# Patient Record
(demographics unavailable — no encounter records)

---

## 2024-10-18 NOTE — HISTORY OF PRESENT ILLNESS
[FreeTextEntry1] : 45M w/ afib (s/p ablation 1/2023), non-obstructive CAD, HFpEF, and h/o ETOH/cocaine abuse who presents for cardiac evaluation.   10/18/24 OV: pt returns today for f/u and clearance prior to colonoscopy. Recently saw Dr. Daniels and will be seeing Dr. Cook next week.  7/12/24 OV: pt returns today for f/u and results of Event Monitor and TTE. Does endorse feeling episode of afib captured on Event Monitor. Otherwise feeling well.  6/14/24 OV: pt returns today for f/u. Saw EP (Dr. Rangel) in April who d/c'd amiodarone. Reports that he experienced an episode of afib back in May 2024 and feels like it has been increasing in frequency.  11/20/23 TH: pt returns today via TH for results of TTE and labs. Overall feeling well, no new complaints.  11/3/23 OV: pt returns today for f/u. Feeling well, no SOB.  8/1/23 TH: returns today for TH visit to discuss lab results. TTE results still pending. 7/21/23 OV: returns today for f/u, feeling well. No new complaints.  4/7/23 OV: returns today for 3 month f/u and TTE results. No new complaints. 1/27/23 OV: labs reviewed, now in SR s/p ablation on 1/23, will need repeat TTE 3/2023 12/30/22 OV: labs ordered   10/18/24 ECG: NSR at 62 bpm, nl axis 6/14/24 ECG: NSR at 68 bpm 11/3/23 ECG: NSR at 64 bpm 3/14/23 ECG: NSR at 65 bpm 1/27/23 ECG: NSR at 78 bpm  12/30/22 ECG: atrial flutter at 81 bpm, LVLL, LAFB

## 2024-10-18 NOTE — ASSESSMENT
[FreeTextEntry1] : # Afib with RVR - previously in SR s/p ablation on 1/2023 - cont Eliquis 5 mg bid - toprol recently increased from 25 to 50 mg qd, now endorsing lightheadedness  - cont toprol 50 mg qd for now; alternatively can change to toprol 25 bid or increase amio to 4d/wk, will defer to EP  - cont amio 200 mg 3x/week for now - 6/2024 Event Monitor c/w 1% burden of afib with HR ranging from  bpm (117 bpm avg) - results discussed in detail - will be seeing Dr. Cook this coming Monday   # HTN - BP today 92/70 - stable - toprol recenty increased to 50 mg from 25 mg and now endorsing lightheadedness - alternatively, can change to toprol 25 bid or increase amio to 4d/wk, will defer to EP - will be seeing Dr. Cook next week - 7/2024 TTE c/w nl LV sys fxn, EF 58%, mild LVH, Ao root 4.1 cm  # HFpEF - resolved - low salt diet - labs reviewed in detail - 3/2023 TTE c/w EF 50-55%, Ao root 4.2 cm - 7/2023 TTE c/w nl LV sys fxn, EF 61%, Ao root 3.8 cm - 11/2023 TTE c/w nl LV sys fxn, EF 55%, Ao root 4.2 cm - cont Entresto to 49-51 mg bid - cont farxiga 10 mg qd - cont toprol 50 mg qd - 7/2024 TTE c/w nl LV sys fxn, EF 58%, mild LVH, Ao root 4.1 cm - results discussed in detail with pt - check TTE 12/2024  # non-obstructive CAD - cont Lipitor 40 mg qd - cont ASA 81 mg qd  # ETOH/cocaine abuse - currently asx - has been in rehab since 2/2023, doing well  # preop cardiac evaluation prior to colonoscopy - hold Eliquis 2 days prior to procedure and resume the day after (assuming no abnormal bleeding during procedure) - can hold farxiga 3-4 days prior to procedure and restart after  - continue all other medications - pt is medically optimized from a cardiac standpoint for the upcoming surgery with the aforementioned recommendations   RTC after TTE

## 2024-10-22 NOTE — ADDENDUM
[FreeTextEntry1] : I, Desirae Mckeon, am scribing for and the presence of Dr. Cook the following sections: HPI, PMH,Family/social history, ROS, Physical Exam, Assessment / Plan.   I, Zain Cook, personally performed the services described in the documentation, reviewed the documentation recorded by the scribe in my presence and it accurately and completely records my words and actions.

## 2024-10-22 NOTE — REASON FOR VISIT
[FreeTextEntry1] : Mateusz Sanchez is a 45 year old man with atrial fibrillation (Eliquis) s/p PVI (cryo, 1/23/23), HFrEF, HTN, ETOH abuse, tobacco abuse and cocaine abuse who presents for follow-up. He was first admitted to [facility] in 12/2022 for heart failure and AFib with RVR. He underwent DCCV on 12/12/22 and again on 12/19/22. Then started on AMIO load with plan for ablation 12/23/22. During anesthesia induction he became hypotensive for which the procedure was aborted and required pressor support briefly. He was discharged and ultimately underwent cryoablation on 1/23/23. Post procedure he was seen at Dr. Daniels' office, where ECG demonstrated an atrial flutter. He was maintained on amiodarone post procedure. Now in outpatient rehab program. He continues to note abstinence from ETOH or cocaine. His EF has recovered based upon most recent Echo 11/2023.  Amiodarone was tapered and then stopped in April 2024.  He has had recurrent palpitations and LTM in June 2024 showed recurrent PAF with RVR.  He reports compliance with medication regimen including OAC.    He has resumed Amiodarone as per Dr. Guevara.  His father is actively dying in hospice care.

## 2024-10-22 NOTE — DISCUSSION/SUMMARY
[FreeTextEntry1] : Mateusz Sanchez is a 45 year old man with atrial fibrillation (Eliquis) s/p PVI (cryo, 1/23/23), HFrEF, HTN, ETOH abuse, tobacco abuse and cocaine abuse who presents for follow-up. He was first admitted to [facility] in 12/2022 for heart failure and AFib with RVR. He underwent DCCV on 12/12/22 and again on 12/19/22. Then started on AMIO load with plan for ablation 12/23/22. During anesthesia induction he became hypotensive for which the procedure was aborted and required pressor support briefly. He was discharged and ultimately underwent cryoablation on 1/23/23.   Post procedure he was seen at Dr. Daniels' office, where ECG demonstrated an atrial flutter.   1.  Atrial fibrillation Recurrent AF with continued breakthrough symptoms despite the use of Amiodarone Given h/o HFrEF and rapid rates in AF, recommend re-do ablation.  He will call to schedule this in the coming weeks as his father is actively dying.   Continue Metoprolol and Amiodarone for now  2.  Stroke Risk CHADS VASc 2  Continue OAC for TE/CVA PPX  3.  AAD use  We discussed the risks of long-term use of Amiodarone and indication for routine lab monitoring (TFTs, LFTs), use of sunscreen, and routine eye exams.  All questions were answered to his apparent satisfaction

## 2024-10-22 NOTE — PHYSICAL EXAM
[Well Developed] : well developed [Well Nourished] : well nourished [No Acute Distress] : no acute distress [Normal Venous Pressure] : normal venous pressure [No Carotid Bruit] : no carotid bruit [Normal S1, S2] : normal S1, S2 [No Murmur] : no murmur [No Rub] : no rub [No Gallop] : no gallop [Clear Lung Fields] : clear lung fields [Good Air Entry] : good air entry [No Respiratory Distress] : no respiratory distress  [Soft] : abdomen soft [Non Tender] : non-tender [No Masses/organomegaly] : no masses/organomegaly [Normal Gait] : normal gait [No Edema] : no edema [No Cyanosis] : no cyanosis [No Clubbing] : no clubbing [No Rash] : no rash [Moves all extremities] : moves all extremities [No Focal Deficits] : no focal deficits [Normal Speech] : normal speech [Alert and Oriented] : alert and oriented

## 2024-10-22 NOTE — DISCUSSION/SUMMARY
[FreeTextEntry1] : Mateusz Sanchez is a 45 year old man with atrial fibrillation (Eliquis) s/p PVI (cryo, 1/23/23), HFrEF, HTN, ETOH abuse, tobacco abuse and cocaine abuse who presents for follow-up. He was first admitted to [facility] in 12/2022 for heart failure and AFib with RVR. He underwent DCCV on 12/12/22 and again on 12/19/22. Then started on AMIO load with plan for ablation 12/23/22. During anesthesia induction he became hypotensive for which the procedure was aborted and required pressor support briefly. He was discharged and ultimately underwent cryoablation on 1/23/23.   Post procedure he was seen at Dr. Daniels' office, where ECG demonstrated an atrial flutter.   1.  Atrial fibrillation Recurrent AF with continued breakthrough symptoms despite the use of Amiodarone Given h/o HFrEF and rapid rates in AF, recommend re-do ablation.  He will call to schedule this in the coming weeks as his father is actively dying.   Continue Metoprolol and Amiodarone for now  2.  Stroke Risk CHADS VASc 2  Continue OAC for TE/CVA PPX  3.  AAD use  We discussed the risks of long-term use of Amiodarone and indication for routine lab monitoring (TFTs, LFTs), use of sunscreen, and routine eye exams.  All questions were answered to his apparent satisfaction

## 2024-10-22 NOTE — REASON FOR VISIT
[FreeTextEntry1] : Mateusz Sanchez is a 45 year old man with atrial fibrillation (Eliquis) s/p PVI (cryo, 1/23/23), HFrEF, HTN, ETOH abuse, tobacco abuse and cocaine abuse who presents for follow-up. He was first admitted to [facility] in 12/2022 for heart failure and AFib with RVR. He underwent DCCV on 12/12/22 and again on 12/19/22. Then started on AMIO load with plan for ablation 12/23/22. During anesthesia induction he became hypotensive for which the procedure was aborted and required pressor support briefly. He was discharged and ultimately underwent cryoablation on 1/23/23. Post procedure he was seen at Dr. Daniels' office, where ECG demonstrated an atrial flutter. He was maintained on amiodarone post procedure. Now in outpatient rehab program. He continues to note abstinence from ETOH or cocaine. His EF has recovered based upon most recent Echo 11/2023.  Amiodarone was tapered and then stopped in April 2024.  He has had recurrent palpitations and LTM in June 2024 showed recurrent PAF with RVR.  He reports compliance with medication regimen including OAC.    He has resumed Amiodarone as per Dr. Guevara.  His father is actively dying in hospice care.

## 2024-12-09 NOTE — ASSESSMENT
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 Now normalized pBNP and NYHA class I symptoms. Abstaining from alcohol and other substances for ~1 year. Cr has also improved to normal.  On triple GDMT (minus MRA, used to be on half-dose unclear when this was stopped). Dizziness has resolved. He had recurrence of AF off Amiodarone. Now is s/p ablation (repeat) and pending EP f/u to decide on further Amio. No signs or symptoms of HF. Hematochezia improved, colonoscopy with ulcerations ?related to ASA  - I recommend stopping ASA - Increase Toprol XL to 50 mg twice daily - Entresto 49-51 mg bid - Farxiga 10 mg daily - Continue Eliquis 5 mg bid - Amiodarone - pending f/u with EP - Follow-up in 3 months  Larry Wagoner MD

## 2024-12-09 NOTE — HISTORY OF PRESENT ILLNESS
[FreeTextEntry1] : Date of HF diagnosis: 12/2022 Etiology of CMP: NICMP Date of last hospitalization: 12/2022 Date of last echocardiogram: 7/2023 LVEF/LVEDD:64%/5.3cm Type of ischemic work-up: CCTA Prior RHC: No  Diabetes: No Afib: Yes CKD:No ICD/CRT: No  ACEi/ARB: No ARNI: Yes MRA: No BB: Yes SGLT2i: Yes Nitrates/Hydralazine: No  CardioMEMs: No In clinical trial: No  44 yo man with index HF admission in 12/2022 to [facility]. Underwent DCCV for AF. Was discharged on ARB and BB, returned with abdominal pain and nausea (possibly low output signs) and recurrence of AF. Was loaded with Amiodarone and referred for AF ablation. During anesthesia induction had profound hypotension, procedure was aborted. He was transferred to CCU where he was treated with inotropes until normalization of hemodynamics. No RHC performed.  Eventually transferred to floor and discharged on ARNI, BB, MRA and SGLT2i. His Cr was unstable. He did not have gross volume overload. He was seen by gen [facility] on 12/30 when his BP was 96/66 and labs obtained that day showed a K~5.6 and Cr of 1.99 (best baseline ~1.6). He hasn't made changes since.  After my first visit with him, he underwent elective PVI with Dr. Rangel and i back at home on Amiodarone and GDMT. On 1/30/23 he came to see me complaining of worsening weakness and dizziness. ECG performed here confirmed recurrence of Afib.  He has abstained from alcohol and other substances for ~9 months and is tolerating low dose quadruple GDMT. His pBNP has normalized. He has NYHA class I symptoms. Last echo was done in 7/2023 with complete recovery of LVEF. His only complaint is significant dizziness on multiple occasions which is impacting his QOL.  After our last visit, he underwent a repeat Afib ablation. Remains on AFib until next EP visit. Has no HF signs or symptoms. Compliant with medications. Underwent a colonoscopy which found small ulcerations which according to GI could be related to ASA. He has no established CAD and is on Eliquis.

## 2024-12-09 NOTE — CARDIOLOGY SUMMARY
[de-identified] : 1. Left ventricular cavity is normal in size. Left ventricular wall thickness is mildly increased. Left ventricular systolic function is normal with an ejection fraction of 58 % by Chisholm's method of disks. There are no regional wall motion abnormalities seen. 2. Normal left ventricular diastolic function. 3. Normal right ventricular cavity size, with normal wall thickness, and normal right ventricular systolic function. 4. Normal left and right atrial size. 5. Tricuspid aortic valve with normal leaflet excursion. 6. Aortic root at the sinuses of Valsalva is dilated, measuring 4.10 cm (indexed 1.88 cm/m). 7. Trace aortic regurgitation. 8. Mild pulmonic regurgitation. 9. Trivial pericardial effusion. 10. No echocardiographic evidence of pulmonary hypertension.

## 2025-01-16 NOTE — CARDIOLOGY SUMMARY
[de-identified] : 10/22/24 NSR 4/16/24 NSR, QTc WNL [de-identified] : TTE 12/9/24 1. Left ventricular cavity is normal in size. Left ventricular wall thickness is normal. Left ventricular systolic function is normal with an ejection fraction of 51 % by Chisholm's method of disks. 2. Normal left ventricular diastolic function. 3. Normal right ventricular cavity size, with normal wall thickness, and probably normal right ventricular systolic function. 4. Normal left and right atrial size. 5. Tricuspid aortic valve with normal leaflet excursion. 6. Mild aortic regurgitation. 7. Aortic root at the sinuses of Valsalva is upper limits of normal, measuring 4.30 cm (indexed 1.97 cm/m). 8. Mild pulmonic regurgitation. 9. Estimated pulmonary artery systolic pressure is 19 mmHg, consistent with normal pulmonary artery pressure. 10. Trace pericardial effusion noted adjacent to the right atrium.

## 2025-01-16 NOTE — DISCUSSION/SUMMARY
[FreeTextEntry1] : Mateusz Sanchez is a 45 year old man with atrial fibrillation (Eliquis) s/p PVI (cryo, 1/23/23), HFrEF, HTN, ETOH abuse, tobacco abuse and cocaine abuse who presents for follow-up. He was first admitted to [facility] in 12/2022 for heart failure and AFib with RVR. He underwent DCCV on 12/12/22 and again on 12/19/22. Then started on AMIO load with plan for ablation 12/23/22. During anesthesia induction he became hypotensive for which the procedure was aborted and required pressor support briefly. He was discharged and ultimately underwent cryoablation on 1/23/23 followed by re-do ablation 12/2024  1.  Atrial fibrillation Maintaining sinus rhythm without symptoms of recurrent arrhythmia Continue Toprol XL for rate control Begin to gradually taper Amiodarone and D/C at the end of February   2.  Stroke Risk CHADS VASc 2  Continue OAC for TE/CVA PPX  3.  AAD use  We discussed the risks of long-term use of Amiodarone and indication for routine lab monitoring (TFTs, LFTs), use of sunscreen, and routine eye exams.  F/U 3 months, sooner as needed

## 2025-01-16 NOTE — REASON FOR VISIT
[FreeTextEntry1] : Mateusz Sanchez is a 45 year old man with atrial fibrillation (Eliquis) s/p PVI (cryo, 1/23/23), HFrEF, HTN, ETOH abuse, tobacco abuse and cocaine abuse who presents for follow-up. He was first admitted to [facility] in 12/2022 for heart failure and AFib with RVR. He underwent DCCV on 12/12/22 and again on 12/19/22. Then started on AMIO load with plan for ablation 12/23/22. During anesthesia induction he became hypotensive for which the procedure was aborted and required pressor support briefly. He was discharged and ultimately underwent cryoablation on 1/23/23. Post procedure he was seen at Dr. Daniels' office, where ECG demonstrated an atrial flutter. He was maintained on amiodarone post procedure. Now in outpatient rehab program. He continues to note abstinence from ETOH or cocaine. His EF has recovered based upon most recent Echo 11/2023.  Amiodarone was tapered and then stopped in April 2024.  He has had recurrent palpitations and LTM in June 2024 showed recurrent PAF with RVR.  He reports compliance with medication regimen including OAC.    He resumed Amiodarone as per Dr. Guevara.  Now s/p repeat ablation 12/5/24: re isolation of the right sided PVs and CTI ablation for induced typical atrial flutter.  He feels well and notes no recurrent palpitations.  Energy level has been good.  No groin complaints.  He denies any bleeding issues.    Continues to abstain from ETOH/ illicit drug use.

## 2025-01-16 NOTE — DISCUSSION/SUMMARY
[FreeTextEntry1] : Mateusz Sanchez is a 45 year old man with atrial fibrillation (Eliquis) s/p PVI (cryo, 1/23/23), HFrEF, HTN, ETOH abuse, tobacco abuse and cocaine abuse who presents for follow-up. He was first admitted to [facility] in 12/2022 for heart failure and AFib with RVR. He underwent DCCV on 12/12/22 and again on 12/19/22. Then started on AMIO load with plan for ablation 12/23/22. During anesthesia induction he became hypotensive for which the procedure was aborted and required pressor support briefly. He was discharged and ultimately underwent cryoablation on 1/23/23 followed by re-do ablation 12/2024  1.  Atrial fibrillation Maintaining sinus rhythm without symptoms of recurrent arrhythmia Continue Toprol XL for rate control Begin to gradually taper Amiodarone and D/C at the end of February   2.  Stroke Risk CHADS VASc 2  Continue OAC for TE/CVA PPX  3.  AAD use  We discussed the risks of long-term use of Amiodarone and indication for routine lab monitoring (TFTs, LFTs), use of sunscreen, and routine eye exams.  F/U 3 months, sooner as needed

## 2025-01-16 NOTE — CARDIOLOGY SUMMARY
[de-identified] : 10/22/24 NSR 4/16/24 NSR, QTc WNL [de-identified] : TTE 12/9/24 1. Left ventricular cavity is normal in size. Left ventricular wall thickness is normal. Left ventricular systolic function is normal with an ejection fraction of 51 % by Chisholm's method of disks. 2. Normal left ventricular diastolic function. 3. Normal right ventricular cavity size, with normal wall thickness, and probably normal right ventricular systolic function. 4. Normal left and right atrial size. 5. Tricuspid aortic valve with normal leaflet excursion. 6. Mild aortic regurgitation. 7. Aortic root at the sinuses of Valsalva is upper limits of normal, measuring 4.30 cm (indexed 1.97 cm/m). 8. Mild pulmonic regurgitation. 9. Estimated pulmonary artery systolic pressure is 19 mmHg, consistent with normal pulmonary artery pressure. 10. Trace pericardial effusion noted adjacent to the right atrium.

## 2025-01-27 NOTE — HISTORY OF PRESENT ILLNESS
[FreeTextEntry1] : 45M PMHx Former drinker/cocaine use (sober since 2022), (Afib s/p DCCV, HFrEF presenting for follow up post-procedure.   1/27/25: -Colonoscopy (1/27/25): Mild diverticulosis of the sigmoid colon. Erythema, edema with small ulcerations in distal portion of terminal ileum (Biopsy: Small intestinal mucosa with no significant histologic abnormality). Remainder of colonic mucosa normal in appearance. Internal hemorrhoids. -Denies any further bleeding. No abdominal pain, nausea/vomiting, abdominal pain -Remains on ASA and Eliquis. Reports that the ASA was added on late 2024.   9/27/24: Patient referred by Dr Daniels. (HF physician) for colonoscopy evaluation and for reported occasional hematochezia. No prior colonoscopy evaluation. States that he's noticed it 2x, last time was last week. States he notes it as a solid streak of dark red blood covering stool. No associated pain or straining at the time this is happening. No known history of hemorrhoids.  States he's a former drinker and cocaine user. Stopped prior to history admission for HF exacerbation in 2022. States he's been focusing a lot on his overall health since then, has remained on GDMT, following closely with cardiology, HF and EP for management of his HF.  States he has experienced reflux in the past, more so when he was drinking daily. Now only occasionally experiences reflux symptoms, probably 3x/month; usually occurs when he eats foods that trigger his symptoms (i.e tomatoes). Denies any dysphagia, odynophagia.  HF physician - Dr Daniels Cardiologist -Dr Guevara EP - Dr Rangel   BW: Hgb 15.3 (11/6/23) PLTs 130 (11/6/23) Cr 1.27 (3/18/24) TB 0.2 (3/18/24) Alk phos 56 (3/18/24) AST 25 (3/18/24) ALT 31 (3/18/24)  TTE (7/8/24): 1. Left ventricular cavity is normal in size. Left ventricular wall thickness is mildly increased. Left ventricular systolic function is normal with an ejection fraction of 58 % by Chisholm's method of disks. There are no regional wall motion abnormalities seen. 2. Normal left ventricular diastolic function. 3. Normal right ventricular cavity size, with normal wall thickness, and normal right ventricular systolic function. 4. Normal left and right atrial size. 5. Tricuspid aortic valve with normal leaflet excursion. 6. Aortic root at the sinuses of Valsalva is dilated, measuring 4.10 cm (indexed 1.88 cm/m). 7. Trace aortic regurgitation. 8. Mild pulmonic regurgitation. 9. Trivial pericardial effusion. 10. No echocardiographic evidence of pulmonary hypertension.  Referred by - Dr Daniels  PMHx - Former drinker/cocaine use (sober since 2022), (Afib s/p DCCV, HFrEF PSHx - hernia repair (2006), ablation (2023), s/p DCCV x 2 Rx - Amiodarone, Farxiga, Entresto, Toprol XL, Eliquis 5 mg BID, lipitor, ASA 81 mg daily Supplements/herbs/OTC - denies A/C or NSAIDs? - only ASA FMHx - mother - diverticulitis; no CRC or GI related malignancy; no IBD Allergies - NKDA EtOH - sober since Dec 2022 Smoking - former smoker Drugs - cocaine - stopped 2022 Diet: Doesn't eat red meat, minimal dairy intake  EGD - no prior Colonoscopy - no prior

## 2025-01-27 NOTE — PHYSICAL EXAM
[Alert] : alert [Normal Voice/Communication] : normal voice/communication [Healthy Appearing] : healthy appearing [No Acute Distress] : no acute distress [Sclera] : the sclera and conjunctiva were normal [Hearing Threshold Finger Rub Not [place]] : hearing was normal [Normal Lips/Gums] : the lips and gums were normal [Oropharynx] : the oropharynx was normal [Normal Appearance] : the appearance of the neck was normal [No Neck Mass] : no neck mass was observed [No Respiratory Distress] : no respiratory distress [No Acc Muscle Use] : no accessory muscle use [Respiration, Rhythm And Depth] : normal respiratory rhythm and effort [Abdomen Tenderness] : non-tender [No Masses] : no abdominal mass palpated [Abdomen Soft] : soft [Cervical Lymph Nodes Enlarged Posterior Bilaterally] : no posterior cervical lymphadenopathy [Supraclavicular Lymph Nodes Enlarged Bilaterally] : no supraclavicular lymphadenopathy [Axillary Lymph Nodes Enlarged Bilaterally] : no axillary lymphadenopathy [No CVA Tenderness] : no CVA  tenderness [No Spinal Tenderness] : no spinal tenderness [Abnormal Walk] : normal gait [No Clubbing, Cyanosis] : no clubbing or cyanosis of the fingernails [No Joint Swelling] : no joint swelling seen [Normal Color / Pigmentation] : normal skin color and pigmentation [] : no rash [Cranial Nerves Intact] : cranial nerves 2-12 were intact [Sensation] : the sensory exam was normal to light touch and pinprick [Motor Exam] : the motor exam was normal [Oriented To Time, Place, And Person] : oriented to person, place, and time

## 2025-01-27 NOTE — PHYSICAL EXAM
[Alert] : alert [Normal Voice/Communication] : normal voice/communication [Healthy Appearing] : healthy appearing [No Acute Distress] : no acute distress [Sclera] : the sclera and conjunctiva were normal [Hearing Threshold Finger Rub [place]] : hearing was normal [Normal Lips/Gums] : the lips and gums were normal [Oropharynx] : the oropharynx was normal [Normal Appearance] : the appearance of the neck was normal [No Neck Mass] : no neck mass was observed [No Respiratory Distress] : no respiratory distress [No Acc Muscle Use] : no accessory muscle use [Respiration, Rhythm And Depth] : normal respiratory rhythm and effort [Abdomen Tenderness] : non-tender [No Masses] : no abdominal mass palpated [Abdomen Soft] : soft [Cervical Lymph Nodes Enlarged Posterior Bilaterally] : no posterior cervical lymphadenopathy [Supraclavicular Lymph Nodes Enlarged Bilaterally] : no supraclavicular lymphadenopathy [Axillary Lymph Nodes Enlarged Bilaterally] : no axillary lymphadenopathy [No CVA Tenderness] : no CVA  tenderness [No Spinal Tenderness] : no spinal tenderness [Abnormal Walk] : normal gait [No Clubbing, Cyanosis] : no clubbing or cyanosis of the fingernails [No Joint Swelling] : no joint swelling seen [Normal Color / Pigmentation] : normal skin color and pigmentation [] : no rash [Cranial Nerves Intact] : cranial nerves 2-12 were intact [Sensation] : the sensory exam was normal to light touch and pinprick [Motor Exam] : the motor exam was normal [Oriented To Time, Place, And Person] : oriented to person, place, and time

## 2025-01-27 NOTE — ASSESSMENT
[FreeTextEntry1] : 45M PMHx Former drinker/cocaine use (sober since 2022), (Afib s/p DCCV, HFrEF presenting for follow up post-procedure.   #Hematochezia  Colonoscopy (1/27/25): Mild diverticulosis of the sigmoid colon. Erythema, edema with small ulcerations in distal portion of terminal ileum (Biopsy: Small intestinal mucosa with no significant histologic abnormality). Remainder of colonic mucosa normal in appearance. Internal hemorrhoids. - Reviewed most recent BW  - Recommend repeat colonoscopy in 10 years for screening, 2034  RTC PRN

## 2025-03-07 NOTE — HISTORY OF PRESENT ILLNESS
[FreeTextEntry1] : 45M w/ afib (s/p ablation 1/2023), non-obstructive CAD, HFpEF, and h/o ETOH/cocaine abuse who presents for cardiac evaluation.   3/7/25 OV: pt returns today for f/u. Had a repeat ablation with Dr. Cook in 12/2024. Overall feeling well since last seen, no new complaints. Now has tapered off amiodarone.  10/18/24 OV: pt returns today for f/u and clearance prior to colonoscopy. Recently saw Dr. Daniels and will be seeing Dr. Cook next week.  7/12/24 OV: pt returns today for f/u and results of Event Monitor and TTE. Does endorse feeling episode of afib captured on Event Monitor. Otherwise feeling well.  6/14/24 OV: pt returns today for f/u. Saw EP (Dr. Rangel) in April who d/c'd amiodarone. Reports that he experienced an episode of afib back in May 2024 and feels like it has been increasing in frequency.  11/20/23 TH: pt returns today via TH for results of TTE and labs. Overall feeling well, no new complaints.  11/3/23 OV: pt returns today for f/u. Feeling well, no SOB.  8/1/23 TH: returns today for TH visit to discuss lab results. TTE results still pending. 7/21/23 OV: returns today for f/u, feeling well. No new complaints.  4/7/23 OV: returns today for 3 month f/u and TTE results. No new complaints. 1/27/23 OV: labs reviewed, now in SR s/p ablation on 1/23, will need repeat TTE 3/2023 12/30/22 OV: labs ordered    3/7/25 ECG: NSR at 64 bpm, nl axis 10/18/24 ECG: NSR at 62 bpm, nl axis 6/14/24 ECG: NSR at 68 bpm 11/3/23 ECG: NSR at 64 bpm 3/14/23 ECG: NSR at 65 bpm 1/27/23 ECG: NSR at 78 bpm  12/30/22 ECG: atrial flutter at 81 bpm, LVLL, LAFB  No antibiotics or any antibiotics < 2 hrs prior to birth

## 2025-03-07 NOTE — ASSESSMENT
[FreeTextEntry1] : # AF w/RVR - previously in SR s/p ablation on 1/2023 - 6/2024 Event Monitor c/w 1% burden of afib with HR ranging from  bpm (117 bpm avg) - results discussed in detail - s/p repeat ablation with EP (Dr. Cook) 12/2024 - now has tapered off amiodarone as per EP - cont Eliquis 5 mg bid - cont toprol 50 mg bid  # HTN - BP today 108/70 - stable for now - cont toprol 50 mg bid - 7/2024 TTE c/w nl LV sys fxn, EF 58%, mild LVH, Ao root 4.1 cm - 12/2024 TTE c/w nl LV size and sys fxn, EF 51% - results discussed in detail with pt   # HFpEF - resolved - ed done re low Na diet - 3/2023 TTE c/w EF 50-55% - 7/2023 TTE c/w nl LV sys fxn, EF 61% - 11/2023 TTE c/w nl LV sys fxn, EF 55% - 7/2024 TTE c/w nl LV sys fxn, EF 58% - 12/2024 TTE c/w nl LV size and sys fxn, EF 51% - results discussed in detail with pt  - cont Entresto to 49-51 mg bid - cont farxiga 10 mg qd - cont toprol 50 mg bid  # non-obstructive CAD - cont Lipitor 40 mg qd - cont ASA 81 mg qd - check labs  # ETOH/cocaine abuse - currently asx - has been in rehab since 2/2023, doing well  RTC Monday via TH

## 2025-03-07 NOTE — HISTORY OF PRESENT ILLNESS
[FreeTextEntry1] : 45M w/ afib (s/p ablation 1/2023), non-obstructive CAD, HFpEF, and h/o ETOH/cocaine abuse who presents for cardiac evaluation.   3/7/25 OV: pt returns today for f/u. Had a repeat ablation with Dr. Cook in 12/2024. Overall feeling well since last seen, no new complaints. Now has tapered off amiodarone.  10/18/24 OV: pt returns today for f/u and clearance prior to colonoscopy. Recently saw Dr. Daniels and will be seeing Dr. Cook next week.  7/12/24 OV: pt returns today for f/u and results of Event Monitor and TTE. Does endorse feeling episode of afib captured on Event Monitor. Otherwise feeling well.  6/14/24 OV: pt returns today for f/u. Saw EP (Dr. Rangel) in April who d/c'd amiodarone. Reports that he experienced an episode of afib back in May 2024 and feels like it has been increasing in frequency.  11/20/23 TH: pt returns today via TH for results of TTE and labs. Overall feeling well, no new complaints.  11/3/23 OV: pt returns today for f/u. Feeling well, no SOB.  8/1/23 TH: returns today for TH visit to discuss lab results. TTE results still pending. 7/21/23 OV: returns today for f/u, feeling well. No new complaints.  4/7/23 OV: returns today for 3 month f/u and TTE results. No new complaints. 1/27/23 OV: labs reviewed, now in SR s/p ablation on 1/23, will need repeat TTE 3/2023 12/30/22 OV: labs ordered    3/7/25 ECG: NSR at 64 bpm, nl axis 10/18/24 ECG: NSR at 62 bpm, nl axis 6/14/24 ECG: NSR at 68 bpm 11/3/23 ECG: NSR at 64 bpm 3/14/23 ECG: NSR at 65 bpm 1/27/23 ECG: NSR at 78 bpm  12/30/22 ECG: atrial flutter at 81 bpm, LVLL, LAFB

## 2025-03-10 NOTE — REASON FOR VISIT
[Home] : at home, [unfilled] , at the time of the visit. [Medical Office: ([facility])___] : at the medical office located in  [Telephone (audio)] : This telephonic visit was provided via audio only technology. [No access to tele-video equipment] : patient lacks access to tele-video equipment. [Verbal consent obtained from patient] : the patient, [unfilled]

## 2025-03-10 NOTE — REASON FOR VISIT
[Home] : at home, [unfilled] , at the time of the visit. [Medical Office: ([facility])___] : at the medical office located in  [Telephone (audio)] : This telephonic visit was provided via audio only technology. [No access to tele-video equipment] : patient lacks access to tele-video equipment. [Verbal consent obtained from patient] : the patient, [unfilled]

## 2025-03-20 NOTE — ASSESSMENT
[FreeTextEntry1] : # AF w/RVR - previously in SR s/p ablation on 1/2023 - 6/2024 Event Monitor c/w 1% burden of afib with HR ranging from  bpm (117 bpm avg) - results discussed in detail - s/p repeat ablation with EP (Dr. Cook) 12/2024 - now has tapered off amiodarone as per EP - cont Eliquis 5 mg bid - cont toprol 50 mg bid  # HTN - stable for now - cont toprol 50 mg bid - 7/2024 TTE c/w nl LV sys fxn, EF 58%, mild LVH, Ao root 4.1 cm - 12/2024 TTE c/w nl LV size and sys fxn, EF 51% - results discussed in detail with pt  # HFpEF - resolved - ed done re low Na diet - 3/2023 TTE c/w EF 50-55% - 7/2023 TTE c/w nl LV sys fxn, EF 61% - 11/2023 TTE c/w nl LV sys fxn, EF 55% - 7/2024 TTE c/w nl LV sys fxn, EF 58% - 12/2024 TTE c/w nl LV size and sys fxn, EF 51% - results discussed in detail with pt - cont Entresto to 49-51 mg bid - cont farxiga 10 mg qd - cont toprol 50 mg bid  # non-obstructive CAD - cont Lipitor 40 mg qd - cont ASA 81 mg qd - 3/2025 lipid panel c/w LDL 72, HDL 36, ,  - results discussed in detail with pt  - ed done re dietary modifications  # ETOH/cocaine abuse - currently asx - has been in rehab since 2/2023, doing well  RTC 9/2025  Spent 11 minutes on telehealth/phone visit, all questions answered in detail.

## 2025-04-14 NOTE — REASON FOR VISIT
[FreeTextEntry1] : Mateusz Sanchez is a 45 year old man with atrial fibrillation (Eliquis) s/p PVI (cryo, 1/23/23), HFrEF, HTN, ETOH abuse, tobacco abuse and cocaine abuse who presents for follow-up. He was first admitted to [facility] in 12/2022 for heart failure and AFib with RVR. He underwent DCCV on 12/12/22 and again on 12/19/22. Then started on AMIO load with plan for ablation 12/23/22. During anesthesia induction he became hypotensive for which the procedure was aborted and required pressor support briefly. He was discharged and ultimately underwent cryoablation on 1/23/23. Post procedure he was seen at Dr. Daniels' office, where ECG demonstrated an atrial flutter. He was maintained on amiodarone post procedure. Now in outpatient rehab program. He continues to note abstinence from ETOH or cocaine. His EF has recovered based upon most recent Echo 11/2023.  Amiodarone was tapered and then stopped in April 2024.  He has had recurrent palpitations and LTM in June 2024 showed recurrent PAF with RVR.  He reports compliance with medication regimen including OAC.    He resumed Amiodarone as per Dr. Guevara.  Now s/p repeat ablation 12/5/24: re isolation of the right sided PVs and CTI ablation for induced typical atrial flutter.  He feels well and notes no recurrent palpitations.  Energy level has been good.  No groin complaints.  He denies any bleeding issues.    Continues to abstain from ETOH/ illicit drug use.

## 2025-04-14 NOTE — REASON FOR VISIT
[FreeTextEntry1] : Mateusz Sanchez is a 45 year old man with atrial fibrillation (Eliquis) s/p PVI (cryo, 1/23/23), HFrEF, HTN, ETOH abuse, tobacco abuse and cocaine abuse who presents for follow-up. He was first admitted to [facility] in 12/2022 for heart failure and AFib with RVR. He underwent DCCV on 12/12/22 and again on 12/19/22. Then started on AMIO load with plan for ablation 12/23/22. During anesthesia induction he became hypotensive for which the procedure was aborted and required pressor support briefly. He was discharged and ultimately underwent cryoablation on 1/23/23. Post procedure he was seen at Dr. Daniels' office, where ECG demonstrated an atrial flutter. He was maintained on amiodarone post procedure. Now in outpatient rehab program. He continues to note abstinence from ETOH or cocaine. His EF has recovered based upon most recent Echo 11/2023.  Amiodarone was tapered and then stopped in April 2024.  He has had recurrent palpitations and LTM in June 2024 showed recurrent PAF with RVR.  He reports compliance with medication regimen including OAC.    He resumed Amiodarone as per Dr. Guevara.  Now s/p repeat ablation 12/5/24: re isolation of the right sided PVs and CTI ablation for induced typical atrial flutter.  He feels well and notes no recurrent palpitations.  Energy level has been good.  No groin complaints.  He denies any bleeding issues.    Continues to abstain from ETOH/ illicit drug use.

## 2025-04-14 NOTE — REVIEW OF SYSTEMS
What Is The Reason For Today's Visit?: Full Body Skin Examination
[Negative] : Heme/Lymph
[Negative] : Heme/Lymph

## 2025-04-17 NOTE — HISTORY OF PRESENT ILLNESS
[FreeTextEntry1] : 45-year-old man who had several hospitalizations in the past 2 to 3 years, partly related to prior use of alcohol and other substances.  However he has been sober after rehab and has had no alcohol and no drugs.  He does have a diagnosis of HFrEF and atrial fibrillation.  He is maintained on Entresto, Farxiga, amiodarone, Eliquis, metoprolol.  He had an episode of MIA during hospitalization but his creatinine steadily improved with creatinine falling to 1.6, then 1.3, and most recently last month, 1.21 with a BUN of 12, GFR 75.  Lipids are well-controlled with an LDL of 72.  His A1c is 5.6.  His hemoglobin is 16.2.  K is 5.2 and CO2 25.  He can now walk 3 to 4 miles with no difficulty.  He feels great and is working full-time.

## 2025-04-17 NOTE — ASSESSMENT
[FreeTextEntry1] : 45-year-old man who is renal function has steadily improved, heart failure is controlled, and he really has gotten his life in order.  He has remained sober since rehab and is doing very nicely.  BP is excellent, and he is not limited in terms of walking or exertion.  He will return in 8 months.

## 2025-04-21 NOTE — DISCUSSION/SUMMARY
[FreeTextEntry1] : Mateusz Sanchez is a 45 year old man with atrial fibrillation (Eliquis) s/p PVI (cryo, 1/23/23), HFrEF, HTN, ETOH abuse, tobacco abuse and cocaine abuse who presents for follow-up. He was first admitted to [facility] in 12/2022 for heart failure and AFib with RVR. He underwent DCCV on 12/12/22 and again on 12/19/22. Then started on AMIO load with plan for ablation 12/23/22. During anesthesia induction he became hypotensive for which the procedure was aborted and required pressor support briefly. He was discharged and ultimately underwent cryoablation on 1/23/23 followed by re-do ablation 12/2024  1.  Atrial fibrillation Maintaining sinus rhythm without symptoms of recurrent arrhythmia Continue Toprol XL for rate control He is tapered off Amiodarone since Feb 2025 We will send him a monitor for arrythmia surveillance   2.  Stroke Risk CHADS VASc 2  Continue OAC for TE/CVA PPX  F/U 3 months, sooner as needed. We will call him with the results of his monitor.  [EKG obtained to assist in diagnosis and management of assessed problem(s)] : EKG obtained to assist in diagnosis and management of assessed problem(s)

## 2025-04-21 NOTE — CARDIOLOGY SUMMARY
[de-identified] : 4/14/25: NSR  10/22/24 NSR 4/16/24 NSR, QTc WNL [de-identified] : TTE 12/9/24 1. Left ventricular cavity is normal in size. Left ventricular wall thickness is normal. Left ventricular systolic function is normal with an ejection fraction of 51 % by Chisholm's method of disks. 2. Normal left ventricular diastolic function. 3. Normal right ventricular cavity size, with normal wall thickness, and probably normal right ventricular systolic function. 4. Normal left and right atrial size. 5. Tricuspid aortic valve with normal leaflet excursion. 6. Mild aortic regurgitation. 7. Aortic root at the sinuses of Valsalva is upper limits of normal, measuring 4.30 cm (indexed 1.97 cm/m). 8. Mild pulmonic regurgitation. 9. Estimated pulmonary artery systolic pressure is 19 mmHg, consistent with normal pulmonary artery pressure. 10. Trace pericardial effusion noted adjacent to the right atrium.

## 2025-04-21 NOTE — DISCUSSION/SUMMARY
[FreeTextEntry1] : Mateusz Sanchez is a 45 year old man with atrial fibrillation (Eliquis) s/p PVI (cryo, 1/23/23), HFrEF, HTN, ETOH abuse, tobacco abuse and cocaine abuse who presents for follow-up. He was first admitted to [facility] in 12/2022 for heart failure and AFib with RVR. He underwent DCCV on 12/12/22 and again on 12/19/22. Then started on AMIO load with plan for ablation 12/23/22. During anesthesia induction he became hypotensive for which the procedure was aborted and required pressor support briefly. He was discharged and ultimately underwent cryoablation on 1/23/23 followed by re-do ablation 12/2024  1.  Atrial fibrillation Maintaining sinus rhythm without symptoms of recurrent arrhythmia Continue Toprol XL for rate control He is tapered off Amiodarone since Feb 2025 We will send him a monitor for arrythmia surveillance   2.  Stroke Risk CHADS VASc 2  Continue OAC for TE/CVA PPX  F/U 3 months, sooner as needed. We will call him with the results of his monitor.  [EKG obtained to assist in diagnosis and management of assessed problem(s)] : EKG obtained to assist in diagnosis and management of assessed problem(s)

## 2025-04-21 NOTE — HISTORY OF PRESENT ILLNESS
[FreeTextEntry1] : Mateusz Sanchez is a 45 year old man with atrial fibrillation (Eliquis) s/p PVI (cryo, 1/23/23), HFrEF, HTN, ETOH abuse, tobacco abuse and cocaine abuse who presents for follow-up. He was first admitted to [facility] in 12/2022 for heart failure and AFib with RVR. He underwent DCCV on 12/12/22 and again on 12/19/22. Then started on AMIO load with plan for ablation 12/23/22. During anesthesia induction he became hypotensive for which the procedure was aborted and required pressor support briefly. He was discharged and ultimately underwent cryoablation on 1/23/23 followed by re-do ablation 12/2024  Since his ablation, he states that he feels very well. He does not have any complaints. He states that he is continuing to abstain from ETOH. He does not have any current complaints at this time.

## 2025-04-21 NOTE — ADDENDUM
[FreeTextEntry1] :   I, Ngoc Jacinto, am scribing for and the presence of Dr. Zain Cook the following sections: HPI, PMH,Family/social history, ROS, Physical Exam, Assessment / Plan.  I, Zain Cook, hereby attest that the medical record entry for this patient accurately reflects signatures/notations that I made on the Date of Service in my capacity as an Attending Physician when I treated/diagnosed the above patient. I do hereby attest that this information is true, accurate and complete to the best of my knowledge.  I was present for the entire visit and supervised the entire visit including assessing the patient, conducting exam and establishing the plan of care.  I personally performed the evaluation and management services and agree with the plan as outlined above.

## 2025-04-21 NOTE — CARDIOLOGY SUMMARY
[de-identified] : 4/14/25: NSR  10/22/24 NSR 4/16/24 NSR, QTc WNL [de-identified] : TTE 12/9/24 1. Left ventricular cavity is normal in size. Left ventricular wall thickness is normal. Left ventricular systolic function is normal with an ejection fraction of 51 % by Chisholm's method of disks. 2. Normal left ventricular diastolic function. 3. Normal right ventricular cavity size, with normal wall thickness, and probably normal right ventricular systolic function. 4. Normal left and right atrial size. 5. Tricuspid aortic valve with normal leaflet excursion. 6. Mild aortic regurgitation. 7. Aortic root at the sinuses of Valsalva is upper limits of normal, measuring 4.30 cm (indexed 1.97 cm/m). 8. Mild pulmonic regurgitation. 9. Estimated pulmonary artery systolic pressure is 19 mmHg, consistent with normal pulmonary artery pressure. 10. Trace pericardial effusion noted adjacent to the right atrium.

## 2025-06-25 NOTE — CARDIOLOGY SUMMARY
[de-identified] : 12/9/2024 TTE: LV 5.2 cm, LVEF 51%, normal diastolic function, RV normal size/ function, LA/RA normal , mild AR, PASP 19 mmHg, IVC dilated w/ normal inspiratory collapse   11/2023 TTE: LVIDd 5.9 cm, LVH (SW 1 cm, PW 1 cm), LVEF 55%, RV normal size/function, LA/RA normal, no significant valvular disease, PASP 26 mmHg, IVC dilated w/ normal inspiratory collapse  12/12/2022 DUNIA: LVEF 10-15% w/ global HK

## 2025-06-25 NOTE — END OF VISIT
[FreeTextEntry3] : I, Dr. Daniels, personally performed the evaluation and management (E/M) services for this patient who presents today. Today, my ACP, Anyi Ambrosio was here to observe my evaluation and management of services.  HFrecEF on GDMT and with alcohol cessation Will obtain CMRI and genetic test to discuss if GDMT de-escalation could be an option.  Larry Wagoner MD

## 2025-06-25 NOTE — CARDIOLOGY SUMMARY
[de-identified] : 12/9/2024 TTE: LV 5.2 cm, LVEF 51%, normal diastolic function, RV normal size/ function, LA/RA normal , mild AR, PASP 19 mmHg, IVC dilated w/ normal inspiratory collapse   11/2023 TTE: LVIDd 5.9 cm, LVH (SW 1 cm, PW 1 cm), LVEF 55%, RV normal size/function, LA/RA normal, no significant valvular disease, PASP 26 mmHg, IVC dilated w/ normal inspiratory collapse  12/12/2022 DUNIA: LVEF 10-15% w/ global HK

## 2025-06-25 NOTE — ASSESSMENT
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 Now normalized pBNP and NYHA class I symptoms. Abstaining from alcohol and other substances for ~1 year. Cr has also improved to normal.  On triple GDMT not on MRA 2/2 hyperkalemia. His heart function has recovered and his proBNP has normalized. He is NYHA Class I. We have recommended he pursue cMRI to look for any LGE and scarring. If there is no burden of scar tissue he will pursue genetic testing and if both test are unrevealing he can consider GDMT de-escalation as there seems to be a clear cofounding precipitant to HF.    #HFrecEF - Toprol XL to 50 mg twice daily - Entresto 49-51 mg bid - Farxiga 10 mg daily -Labs 3/2025 Na 136, K 5.2 with Cr 1.21, and proBNP <36. Check labs today.   #AF/Aflutter  - on BB as above  - Continue Eliquis 5 mg bid - Amiodarone - tapered in 2/2025  - Awaiting Holter from 6/2025   #Hematochezia improved - colonoscopy with ulcerations ?related to ASA

## 2025-06-25 NOTE — HISTORY OF PRESENT ILLNESS
[FreeTextEntry1] : Date of HF diagnosis: 12/2022 Etiology of CMP: NICMP Date of last hospitalization: 12/2022 Date of last echocardiogram: 7/2023 LVEF/LVEDD:64%/5.3cm Type of ischemic work-up: CCTA Prior RHC: No  Diabetes: No Afib: Yes CKD:No ICD/CRT: No  ACEi/ARB: No ARNI: Yes MRA: No BB: Yes SGLT2i: Yes Nitrates/Hydralazine: No  CardioMEMs: No In clinical trial: No  47 yo man with index HF admission in 12/2022 to [facility]. Underwent DCCV for AF. Was discharged on ARB and BB, returned with abdominal pain and nausea (possibly low output signs) and recurrence of AF. Was loaded with Amiodarone and referred for AF ablation. During anesthesia induction had profound hypotension, procedure was aborted. He was transferred to CCU where he was treated with inotropes until normalization of hemodynamics. No RHC performed.  Eventually transferred to floor and discharged on ARNI, BB, MRA and SGLT2i. His Cr was unstable. He did not have gross volume overload. He was seen by gen cards on 12/30 when his BP was 96/66 and labs obtained that day showed a K~5.6 and Cr of 1.99 (best baseline ~1.6). He hasn't made changes since.  After my first visit with him, he underwent elective PVI with Dr. Rangel and i back at home on Amiodarone and GDMT. On 1/30/23 he came to see me complaining of worsening weakness and dizziness. ECG performed here confirmed recurrence of Afib.  Since he was last seen in December of 2024 and completed subsequent AF ablation on 12/5/2024 and has been tapered off Amiodarone since 3/1/2025. He has abstained from alcohol and other substances for over 2 year and is tolerating low dose quadruple GDMT. His pBNP has normalized. He has NYHA class I symptoms. He took the subway here today in the extreme [facility] heat and does not feel limited in any capacity. He is no longer LH/dizzy. Denies orthopnea. Denies LE edema.   He on occasion feels a skipped heart beat and completed an ambulatory 2 week heart monitor with Dr. Cook and mailed it back on Tuesday/ results are pending.   Underwent a colonoscopy which found small ulcerations which according to GI could be related to ASA. He has no established CAD and is on Eliquis.

## 2025-06-25 NOTE — END OF VISIT
[FreeTextEntry3] : I, Dr. Daniels, personally performed the evaluation and management (E/M) services for this patient who presents today. Today, my ACP, Anyi Ambrosio was here to observe my evaluation and management of services.  HFrecEF on GDMT and with alcohol cessation Will obtain CMRI and genetic test to discuss if GDMT de-escalation could be an option.  Larry Wgaoner MD

## 2025-06-25 NOTE — HISTORY OF PRESENT ILLNESS
[FreeTextEntry1] : Date of HF diagnosis: 12/2022 Etiology of CMP: NICMP Date of last hospitalization: 12/2022 Date of last echocardiogram: 7/2023 LVEF/LVEDD:64%/5.3cm Type of ischemic work-up: CCTA Prior RHC: No  Diabetes: No Afib: Yes CKD:No ICD/CRT: No  ACEi/ARB: No ARNI: Yes MRA: No BB: Yes SGLT2i: Yes Nitrates/Hydralazine: No  CardioMEMs: No In clinical trial: No  47 yo man with index HF admission in 12/2022 to [facility]. Underwent DCCV for AF. Was discharged on ARB and BB, returned with abdominal pain and nausea (possibly low output signs) and recurrence of AF. Was loaded with Amiodarone and referred for AF ablation. During anesthesia induction had profound hypotension, procedure was aborted. He was transferred to CCU where he was treated with inotropes until normalization of hemodynamics. No RHC performed.  Eventually transferred to floor and discharged on ARNI, BB, MRA and SGLT2i. His Cr was unstable. He did not have gross volume overload. He was seen by gen cards on 12/30 when his BP was 96/66 and labs obtained that day showed a K~5.6 and Cr of 1.99 (best baseline ~1.6). He hasn't made changes since.  After my first visit with him, he underwent elective PVI with Dr. Rangel and i back at home on Amiodarone and GDMT. On 1/30/23 he came to see me complaining of worsening weakness and dizziness. ECG performed here confirmed recurrence of Afib.  Since he was last seen in December of 2024 and completed subsequent AF ablation on 12/5/2024 and has been tapered off Amiodarone since 3/1/2025. He has abstained from alcohol and other substances for over 2 year and is tolerating low dose quadruple GDMT. His pBNP has normalized. He has NYHA class I symptoms. He [place] the subway here today in the extreme [facility] heat and does not feel limited in any capacity. He is no longer LH/dizzy. Denies orthopnea. Denies LE edema.   He on occasion feels a skipped heart beat and completed an ambulatory 2 week heart monitor with Dr. Cook and mailed it back on Tuesday/ results are pending.   Underwent a colonoscopy which found small ulcerations which according to GI could be related to ASA. He has no established CAD and is on Eliquis.